# Patient Record
Sex: MALE | URBAN - METROPOLITAN AREA
[De-identification: names, ages, dates, MRNs, and addresses within clinical notes are randomized per-mention and may not be internally consistent; named-entity substitution may affect disease eponyms.]

---

## 2020-06-27 ENCOUNTER — OUT OF OFFICE VISIT (OUTPATIENT)
Dept: URBAN - METROPOLITAN AREA MEDICAL CENTER 28 | Facility: MEDICAL CENTER | Age: 80
End: 2020-06-27
Payer: MEDICARE

## 2020-06-27 DIAGNOSIS — D50.0 ANEMIA DUE TO BLOOD LOSS: ICD-10-CM

## 2020-06-27 DIAGNOSIS — K31.89 ACQUIRED DEFORMITY OF PYLORUS: ICD-10-CM

## 2020-06-27 DIAGNOSIS — D69.1 THROMBOCYTOPENIA: ICD-10-CM

## 2020-06-27 DIAGNOSIS — R18.8 ABDOMINAL FLUID COLLECTION: ICD-10-CM

## 2020-06-27 DIAGNOSIS — R93.2 ABNORMAL CHOLANGIOGRAM: ICD-10-CM

## 2020-06-27 DIAGNOSIS — K26.7 CHRONIC DUODENAL ULCER WITHOUT HEMORRHAGE OR PERFORATION: ICD-10-CM

## 2020-06-27 PROCEDURE — 43239 EGD BIOPSY SINGLE/MULTIPLE: CPT | Performed by: INTERNAL MEDICINE

## 2020-06-27 PROCEDURE — 99223 1ST HOSP IP/OBS HIGH 75: CPT | Performed by: INTERNAL MEDICINE

## 2020-06-27 PROCEDURE — G8427 DOCREV CUR MEDS BY ELIG CLIN: HCPCS | Performed by: INTERNAL MEDICINE

## 2020-06-27 PROCEDURE — 99232 SBSQ HOSP IP/OBS MODERATE 35: CPT | Performed by: INTERNAL MEDICINE

## 2020-06-29 ENCOUNTER — OUT OF OFFICE VISIT (OUTPATIENT)
Dept: URBAN - METROPOLITAN AREA MEDICAL CENTER 28 | Facility: MEDICAL CENTER | Age: 80
End: 2020-06-29
Payer: MEDICARE

## 2020-06-29 DIAGNOSIS — K74.69 CIRRHOSIS, CRYPTOGENIC: ICD-10-CM

## 2020-06-29 DIAGNOSIS — D50.0 ANEMIA DUE TO BLOOD LOSS: ICD-10-CM

## 2020-06-29 DIAGNOSIS — C25.9 CANCER OF PANCREAS: ICD-10-CM

## 2020-06-29 DIAGNOSIS — K31.89 ACQUIRED DEFORMITY OF PYLORUS: ICD-10-CM

## 2020-06-29 DIAGNOSIS — K26.9 CHILDHOOD DUODENAL ULCER: ICD-10-CM

## 2020-06-29 DIAGNOSIS — K72.90 ENCEPHALOPATHY, HEPATIC: ICD-10-CM

## 2020-06-29 DIAGNOSIS — D61.818 PANCYTOPENIA: ICD-10-CM

## 2020-06-29 PROCEDURE — 43239 EGD BIOPSY SINGLE/MULTIPLE: CPT | Performed by: INTERNAL MEDICINE

## 2020-06-29 PROCEDURE — 99232 SBSQ HOSP IP/OBS MODERATE 35: CPT | Performed by: INTERNAL MEDICINE

## 2020-07-02 ENCOUNTER — OUT OF OFFICE VISIT (OUTPATIENT)
Dept: URBAN - METROPOLITAN AREA MEDICAL CENTER 28 | Facility: MEDICAL CENTER | Age: 80
End: 2020-07-02
Payer: MEDICARE

## 2020-07-02 DIAGNOSIS — K74.69 CRYPTOGENIC CIRRHOSIS: ICD-10-CM

## 2020-07-02 DIAGNOSIS — K72.90 ENCEPHALOPATHY, HEPATIC: ICD-10-CM

## 2020-07-02 DIAGNOSIS — Z85.07 PERSONAL HISTORY OF PANCREATIC CANCER: ICD-10-CM

## 2020-07-02 DIAGNOSIS — Z87.11 H/O PEPTIC ULCER: ICD-10-CM

## 2020-07-02 PROCEDURE — 99232 SBSQ HOSP IP/OBS MODERATE 35: CPT | Performed by: INTERNAL MEDICINE

## 2020-07-10 ENCOUNTER — OFFICE VISIT (OUTPATIENT)
Dept: URBAN - METROPOLITAN AREA CLINIC 98 | Facility: CLINIC | Age: 80
End: 2020-07-10
Payer: MEDICARE

## 2020-07-10 DIAGNOSIS — K72.90 HEPATIC ENCEPHALOPATHY: ICD-10-CM

## 2020-07-10 DIAGNOSIS — R18.8 ASCITES: ICD-10-CM

## 2020-07-10 DIAGNOSIS — R79.9 ABNORMAL BLOOD LEVEL OF IRON: ICD-10-CM

## 2020-07-10 DIAGNOSIS — K74.69 OTHER CIRRHOSIS OF LIVER: ICD-10-CM

## 2020-07-10 DIAGNOSIS — K27.1 ACUTE PEPTIC ULCER, SITE UNSPECIFIED, WITH PERFORATION: ICD-10-CM

## 2020-07-10 PROCEDURE — 99215 OFFICE O/P EST HI 40 MIN: CPT | Performed by: INTERNAL MEDICINE

## 2020-07-10 PROCEDURE — 99214 OFFICE O/P EST MOD 30 MIN: CPT | Performed by: INTERNAL MEDICINE

## 2020-07-10 PROCEDURE — G9903 PT SCRN TBCO ID AS NON USER: HCPCS | Performed by: INTERNAL MEDICINE

## 2020-07-10 PROCEDURE — 1036F TOBACCO NON-USER: CPT | Performed by: INTERNAL MEDICINE

## 2020-07-10 PROCEDURE — G8427 DOCREV CUR MEDS BY ELIG CLIN: HCPCS | Performed by: INTERNAL MEDICINE

## 2020-07-10 PROCEDURE — G8420 CALC BMI NORM PARAMETERS: HCPCS | Performed by: INTERNAL MEDICINE

## 2020-07-10 PROCEDURE — 81256 HFE GENE: CPT | Performed by: INTERNAL MEDICINE

## 2020-07-10 RX ORDER — PANTOPRAZOLE SODIUM 40 MG/1
1 TABLET TABLET, DELAYED RELEASE ORAL TWICE DAILY
Status: ACTIVE | COMMUNITY

## 2020-07-10 RX ORDER — AMOXICILLIN 500 MG/1
2 CAPSULES CAPSULE ORAL TWICE A DAY
Status: ACTIVE | COMMUNITY

## 2020-07-10 RX ORDER — SUCRALFATE 1 G/1
1 TABLET ON AN EMPTY STOMACH TABLET ORAL TWICE A DAY
Status: ACTIVE | COMMUNITY

## 2020-07-10 RX ORDER — CLARITHROMYCIN 500 MG/1
1 TABLET TABLET, FILM COATED ORAL
Status: ACTIVE | COMMUNITY

## 2020-07-10 RX ORDER — LACTULOSE 10 G/15ML
15 ML SOLUTION ORAL ONCE A DAY
Status: ACTIVE | COMMUNITY

## 2020-07-10 RX ORDER — MIDODRINE HYDROCHLORIDE 5 MG/1
1 TABLET TABLET ORAL THREE TIMES A DAY
Status: ACTIVE | COMMUNITY

## 2020-07-10 RX ORDER — SPIRONOLACTONE 25 MG/1
1 TABLET TABLET, FILM COATED ORAL ONCE A DAY
Status: ACTIVE | COMMUNITY

## 2020-07-10 RX ORDER — FUROSEMIDE 40 MG/1
1 TABLET TABLET ORAL ONCE A DAY
Status: ACTIVE | COMMUNITY

## 2020-07-10 NOTE — PHYSICAL EXAM HENT:
Head,  temporal wasting,  atraumatic,  Face,  Face within normal limits,  Ears,  External ears within normal limits,  Nose/Nasopharynx,  Mouth and Throat, wearing mask

## 2020-07-10 NOTE — HPI-TODAY'S VISIT:
[facility] referral  PCP Dr Flores.   here w wife Sabina who provides the majority of the history; Sanchez brother in law - pul critical care- worked at [employer]  h/o pancreatic cancer 10 yrs ago - went in for umbilical hernia repair - found panc tumor  Dr Jarrod galarza / [place] - 2 rounds chemo + radiation.   . since then has had anemia and has needed at least 2 iron infusion -  was well then early june- admitted to [facility] 6/4 - 6/8  w CT findings of contained perforation at gastrojejunal anastomosis - suspected site of ulcer.  treated conservatively w abx.   dc on H pylori regimen.  Then late june presented w confusion.  NH3 peak at 120  found on CT to have cirrhosis , splenomegaly, small- mod ascites. did have EGD w gastritis, DU, no varices.   - bm 1-2 bm /d ay  lactulose 30cc qid  bp : sbp 110-120 lasix 40 / spironolactone 25 mg/d  -  c/o abdomen distended but not much resp compromise.  is easily fatigued.

## 2020-07-10 NOTE — PHYSICAL EXAM GASTROINTESTINAL
Abdomen , soft, nontender, moderately distended , no guarding or rigidity , no masses palpable , normal bowel sounds , Liver and Spleenno hepatomegaly present , spleen not palpable

## 2020-07-13 ENCOUNTER — WEB ENCOUNTER (OUTPATIENT)
Dept: URBAN - METROPOLITAN AREA CLINIC 98 | Facility: CLINIC | Age: 80
End: 2020-07-13

## 2020-07-14 ENCOUNTER — WEB ENCOUNTER (OUTPATIENT)
Dept: URBAN - METROPOLITAN AREA CLINIC 98 | Facility: CLINIC | Age: 80
End: 2020-07-14

## 2020-07-14 ENCOUNTER — TELEPHONE ENCOUNTER (OUTPATIENT)
Dept: URBAN - METROPOLITAN AREA CLINIC 92 | Facility: CLINIC | Age: 80
End: 2020-07-14

## 2020-07-14 PROBLEM — 441988000 IMAGING OF ABDOMEN ABNORMAL: Status: ACTIVE | Noted: 2020-07-14

## 2020-07-14 RX ORDER — SPIRONOLACTONE 50 MG/1
1 TABLET TABLET, FILM COATED ORAL BID
Qty: 180 TABLET | Refills: 1

## 2020-07-15 ENCOUNTER — OFFICE VISIT (OUTPATIENT)
Dept: URBAN - METROPOLITAN AREA CLINIC 98 | Facility: CLINIC | Age: 80
End: 2020-07-15

## 2020-07-15 ENCOUNTER — TELEPHONE ENCOUNTER (OUTPATIENT)
Dept: URBAN - METROPOLITAN AREA CLINIC 92 | Facility: CLINIC | Age: 80
End: 2020-07-15

## 2020-07-15 PROBLEM — 166318006 BLOOD CHEMISTRY ABNORMAL: Status: ACTIVE | Noted: 2020-07-14

## 2020-07-15 PROBLEM — 389026000 ASCITES: Status: ACTIVE | Noted: 2020-07-14

## 2020-07-15 PROBLEM — 13920009 HEPATIC ENCEPHALOPATHY: Status: ACTIVE | Noted: 2020-07-14

## 2020-07-15 PROBLEM — 255417007 CIRRHOTIC: Status: ACTIVE | Noted: 2020-07-14

## 2020-07-15 RX ORDER — FUROSEMIDE 40 MG/1
1 TABLET TABLET ORAL ONCE A DAY
Qty: 90 | Refills: 1

## 2020-07-15 RX ORDER — LACTULOSE 10 G/15ML
15 ML SOLUTION ORAL
Qty: 4000 MILLILITER | Refills: 1

## 2020-07-22 LAB
A/G RATIO: 0.9
AFP, SERUM, TUMOR MARKER: 1.5
ALBUMIN: 3.4
ALKALINE PHOSPHATASE: 471
ALT (SGPT): 132
AST (SGOT): 128
BASO (ABSOLUTE): 0
BASOS: 1
BILIRUBIN, TOTAL: 0.9
BUN/CREATININE RATIO: 16
BUN: 16
CALCIUM: 9
CARBON DIOXIDE, TOTAL: 24
CHLORIDE: 102
CREATININE: 1.02
EGFR IF AFRICN AM: 82
EGFR IF NONAFRICN AM: 71
EOS (ABSOLUTE): 0.1
EOS: 3
FERRITIN, SERUM: 596
GLOBULIN, TOTAL: 3.7
GLUCOSE: 172
HEMATOCRIT: 35.7
HEMATOLOGY COMMENTS:: (no result)
HEMOGLOBIN: 11.5
HEREDITARY  HEMOCHROMATOSIS: (no result)
IMMATURE CELLS: (no result)
IMMATURE GRANS (ABS): 0
IMMATURE GRANULOCYTES: 0
INR: 1.1
IRON BIND.CAP.(TIBC): 344
IRON SATURATION: 91
IRON: 312
LYMPHS (ABSOLUTE): 0.6
LYMPHS: 22
MCH: 28.9
MCHC: 32.2
MCV: 90
MONOCYTES(ABSOLUTE): 0.3
MONOCYTES: 11
NEUTROPHILS (ABSOLUTE): 1.8
NEUTROPHILS: 63
NRBC: (no result)
PLATELETS: 74
POTASSIUM: 3.9
PROTEIN, TOTAL: 7.1
PROTHROMBIN TIME: 11.7
RBC: 3.98
RDW: 16
SODIUM: 141
UIBC: 32
WBC: 2.8

## 2020-07-24 ENCOUNTER — DASHBOARD ENCOUNTERS (OUTPATIENT)
Age: 80
End: 2020-07-24

## 2020-07-31 ENCOUNTER — OFFICE VISIT (OUTPATIENT)
Dept: URBAN - METROPOLITAN AREA CLINIC 98 | Facility: CLINIC | Age: 80
End: 2020-07-31

## 2020-08-06 ENCOUNTER — OFFICE VISIT (OUTPATIENT)
Dept: URBAN - METROPOLITAN AREA CLINIC 98 | Facility: CLINIC | Age: 80
End: 2020-08-06

## 2022-11-12 NOTE — PHYSICAL EXAM SKIN:
no rashes , no suspicious lesions , no areas of discoloration , no jaundice present , good turgor , no masses , no tenderness on palpation Alert and interactive, no focal deficits